# Patient Record
Sex: FEMALE | Race: BLACK OR AFRICAN AMERICAN | NOT HISPANIC OR LATINO | Employment: FULL TIME | URBAN - METROPOLITAN AREA
[De-identification: names, ages, dates, MRNs, and addresses within clinical notes are randomized per-mention and may not be internally consistent; named-entity substitution may affect disease eponyms.]

---

## 2024-01-21 NOTE — PROGRESS NOTES
"Assessment/Plan   {Assess/PlanSmartLinks:82840}    Bekah AMIN Jay, APRN-CNM     José Lopez is a 53 y.o. female who is here for a routine exam. Periods are {gyn period regularity:715}, lasting {numbers; 0-10:80594} days. Dysmenorrhea:{gyn dysmenorrhea:716}. Cyclic symptoms include {sys gyn cyclic sx:34776}. No intermenstrual bleeding, spotting, or discharge.    Current contraception: {contraceptive method:5051}  History of abnormal Pap smear: {yes***/no:16415::\"no\"}  History of LEEP or cryo:  {yes***/no:61295::\"no\"}  Family history of uterine or ovarian cancer: {yes***/no:44627::\"no\"}  Family history of breast cancer: {yes***/no:36036::\"no\"}  Regular self breast exam: {yes/no:63}  History of abnormal mammogram: {yes***/no:71069::\"no\"}    Menstrual History:  OB History    No obstetric history on file.        Menarche age: ***  No LMP recorded.         ROS    Objective   There were no vitals taken for this visit.    General:   Alert and oriented x 3   Heart:  Thyroid: Regular rate, rhythm  Euthyroid, normal shape and size   Lungs:  Breast: Clear to auscultation bilaterally  Symmetrical, no skin changes/nipple discharge, redness, tenderness, no masses palpated bilaterally   Abdomen: Soft, non tender   Vulva: EGBUS normal   Vagina: Pink, normal discharge   Cervix: No CMT   Uterus: Normal shape, size   Adnexa: NT bilaterally      "

## 2024-01-30 ENCOUNTER — APPOINTMENT (OUTPATIENT)
Dept: OBSTETRICS AND GYNECOLOGY | Facility: CLINIC | Age: 54
End: 2024-01-30
Payer: MEDICARE

## 2024-02-02 ENCOUNTER — APPOINTMENT (OUTPATIENT)
Dept: PRIMARY CARE | Facility: CLINIC | Age: 54
End: 2024-02-02
Payer: MEDICARE

## 2024-02-14 ENCOUNTER — APPOINTMENT (OUTPATIENT)
Dept: OBSTETRICS AND GYNECOLOGY | Facility: HOSPITAL | Age: 54
End: 2024-02-14
Payer: MEDICARE

## 2024-02-23 ENCOUNTER — APPOINTMENT (OUTPATIENT)
Dept: ORTHOPEDIC SURGERY | Facility: CLINIC | Age: 54
End: 2024-02-23
Payer: MEDICARE

## 2024-03-04 ENCOUNTER — APPOINTMENT (OUTPATIENT)
Dept: OBSTETRICS AND GYNECOLOGY | Facility: HOSPITAL | Age: 54
End: 2024-03-04
Payer: MEDICARE

## 2024-03-15 ENCOUNTER — APPOINTMENT (OUTPATIENT)
Dept: RADIOLOGY | Facility: CLINIC | Age: 54
End: 2024-03-15
Payer: MEDICARE

## 2024-03-15 ENCOUNTER — APPOINTMENT (OUTPATIENT)
Dept: ORTHOPEDIC SURGERY | Facility: CLINIC | Age: 54
End: 2024-03-15
Payer: MEDICARE

## 2024-03-19 ENCOUNTER — APPOINTMENT (OUTPATIENT)
Dept: OBSTETRICS AND GYNECOLOGY | Facility: HOSPITAL | Age: 54
End: 2024-03-19
Payer: MEDICARE

## 2024-03-26 ENCOUNTER — APPOINTMENT (OUTPATIENT)
Dept: PRIMARY CARE | Facility: CLINIC | Age: 54
End: 2024-03-26
Payer: MEDICARE

## 2024-04-16 ENCOUNTER — APPOINTMENT (OUTPATIENT)
Dept: OBSTETRICS AND GYNECOLOGY | Facility: HOSPITAL | Age: 54
End: 2024-04-16
Payer: MEDICARE

## 2024-04-29 ENCOUNTER — APPOINTMENT (OUTPATIENT)
Dept: OBSTETRICS AND GYNECOLOGY | Facility: HOSPITAL | Age: 54
End: 2024-04-29
Payer: MEDICARE

## 2024-05-22 ENCOUNTER — LAB (OUTPATIENT)
Dept: LAB | Facility: LAB | Age: 54
End: 2024-05-22
Payer: MEDICARE

## 2024-05-22 ENCOUNTER — OFFICE VISIT (OUTPATIENT)
Dept: OBSTETRICS AND GYNECOLOGY | Facility: HOSPITAL | Age: 54
End: 2024-05-22
Payer: MEDICARE

## 2024-05-22 VITALS
WEIGHT: 229 LBS | DIASTOLIC BLOOD PRESSURE: 76 MMHG | BODY MASS INDEX: 42.14 KG/M2 | SYSTOLIC BLOOD PRESSURE: 153 MMHG | HEIGHT: 62 IN | HEART RATE: 79 BPM

## 2024-05-22 DIAGNOSIS — Z12.4 SCREENING FOR CERVICAL CANCER: ICD-10-CM

## 2024-05-22 DIAGNOSIS — Z00.00 HEALTHCARE MAINTENANCE: Primary | ICD-10-CM

## 2024-05-22 DIAGNOSIS — N95.1 PERIMENOPAUSE: ICD-10-CM

## 2024-05-22 DIAGNOSIS — Z11.3 SCREENING FOR STD (SEXUALLY TRANSMITTED DISEASE): ICD-10-CM

## 2024-05-22 DIAGNOSIS — Z12.31 ENCOUNTER FOR SCREENING MAMMOGRAM FOR MALIGNANT NEOPLASM OF BREAST: ICD-10-CM

## 2024-05-22 DIAGNOSIS — Z00.00 HEALTHCARE MAINTENANCE: ICD-10-CM

## 2024-05-22 LAB
EST. AVERAGE GLUCOSE BLD GHB EST-MCNC: 108 MG/DL
FSH SERPL-ACNC: 60.4 IU/L
HBA1C MFR BLD: 5.4 %
HBV SURFACE AG SERPL QL IA: NONREACTIVE
HCV AB SER QL: NONREACTIVE
HDLC SERPL-MCNC: 63.1 MG/DL
HIV 1+2 AB+HIV1 P24 AG SERPL QL IA: NONREACTIVE
LDLC SERPL DIRECT ASSAY-MCNC: 168 MG/DL (ref 0–129)
LH SERPL-ACNC: 48.4 IU/L
PROGEST SERPL-MCNC: <0.3 NG/ML
TREPONEMA PALLIDUM IGG+IGM AB [PRESENCE] IN SERUM OR PLASMA BY IMMUNOASSAY: NONREACTIVE

## 2024-05-22 PROCEDURE — 83002 ASSAY OF GONADOTROPIN (LH): CPT

## 2024-05-22 PROCEDURE — 99386 PREV VISIT NEW AGE 40-64: CPT

## 2024-05-22 PROCEDURE — 86780 TREPONEMA PALLIDUM: CPT

## 2024-05-22 PROCEDURE — 87389 HIV-1 AG W/HIV-1&-2 AB AG IA: CPT

## 2024-05-22 PROCEDURE — 1036F TOBACCO NON-USER: CPT

## 2024-05-22 PROCEDURE — 87491 CHLMYD TRACH DNA AMP PROBE: CPT

## 2024-05-22 PROCEDURE — 83001 ASSAY OF GONADOTROPIN (FSH): CPT

## 2024-05-22 PROCEDURE — 36415 COLL VENOUS BLD VENIPUNCTURE: CPT

## 2024-05-22 PROCEDURE — 83036 HEMOGLOBIN GLYCOSYLATED A1C: CPT

## 2024-05-22 PROCEDURE — 86803 HEPATITIS C AB TEST: CPT

## 2024-05-22 PROCEDURE — 87661 TRICHOMONAS VAGINALIS AMPLIF: CPT

## 2024-05-22 PROCEDURE — 82672 ASSAY OF ESTROGEN: CPT

## 2024-05-22 PROCEDURE — 88142 CYTOPATH C/V THIN LAYER: CPT | Mod: TC

## 2024-05-22 PROCEDURE — 87340 HEPATITIS B SURFACE AG IA: CPT

## 2024-05-22 PROCEDURE — 84144 ASSAY OF PROGESTERONE: CPT

## 2024-05-22 PROCEDURE — 87624 HPV HI-RISK TYP POOLED RSLT: CPT

## 2024-05-22 PROCEDURE — 83718 ASSAY OF LIPOPROTEIN: CPT

## 2024-05-22 PROCEDURE — 83721 ASSAY OF BLOOD LIPOPROTEIN: CPT

## 2024-05-22 RX ORDER — MULTIVIT-MIN/IRON FUM/FOLIC AC 7.5 MG-4
1 TABLET ORAL DAILY
COMMUNITY

## 2024-05-22 RX ORDER — ASPIRIN 81 MG/1
81 TABLET ORAL DAILY
COMMUNITY

## 2024-05-22 SDOH — ECONOMIC STABILITY: FOOD INSECURITY: WITHIN THE PAST 12 MONTHS, THE FOOD YOU BOUGHT JUST DIDN'T LAST AND YOU DIDN'T HAVE MONEY TO GET MORE.: NEVER TRUE

## 2024-05-22 SDOH — ECONOMIC STABILITY: TRANSPORTATION INSECURITY
IN THE PAST 12 MONTHS, HAS LACK OF TRANSPORTATION KEPT YOU FROM MEETINGS, WORK, OR FROM GETTING THINGS NEEDED FOR DAILY LIVING?: NO

## 2024-05-22 SDOH — ECONOMIC STABILITY: FOOD INSECURITY: WITHIN THE PAST 12 MONTHS, YOU WORRIED THAT YOUR FOOD WOULD RUN OUT BEFORE YOU GOT MONEY TO BUY MORE.: NEVER TRUE

## 2024-05-22 SDOH — ECONOMIC STABILITY: TRANSPORTATION INSECURITY
IN THE PAST 12 MONTHS, HAS THE LACK OF TRANSPORTATION KEPT YOU FROM MEDICAL APPOINTMENTS OR FROM GETTING MEDICATIONS?: NO

## 2024-05-22 ASSESSMENT — SOCIAL DETERMINANTS OF HEALTH (SDOH)
WITHIN THE LAST YEAR, HAVE TO BEEN RAPED OR FORCED TO HAVE ANY KIND OF SEXUAL ACTIVITY BY YOUR PARTNER OR EX-PARTNER?: NO
WITHIN THE LAST YEAR, HAVE YOU BEEN KICKED, HIT, SLAPPED, OR OTHERWISE PHYSICALLY HURT BY YOUR PARTNER OR EX-PARTNER?: NO
WITHIN THE LAST YEAR, HAVE YOU BEEN AFRAID OF YOUR PARTNER OR EX-PARTNER?: NO
WITHIN THE LAST YEAR, HAVE YOU BEEN HUMILIATED OR EMOTIONALLY ABUSED IN OTHER WAYS BY YOUR PARTNER OR EX-PARTNER?: NO

## 2024-05-22 ASSESSMENT — PATIENT HEALTH QUESTIONNAIRE - PHQ9
2. FEELING DOWN, DEPRESSED OR HOPELESS: NOT AT ALL
1. LITTLE INTEREST OR PLEASURE IN DOING THINGS: NOT AT ALL
SUM OF ALL RESPONSES TO PHQ9 QUESTIONS 1 & 2: 0

## 2024-05-22 ASSESSMENT — PAIN SCALES - GENERAL: PAINLEVEL: 0-NO PAIN

## 2024-05-22 NOTE — PROGRESS NOTES
"José Lopez is a 54 y.o. female who is here for Annual Exam (patient here for annual /lmp 24/last pap 22/sti testing /chaperone decline).     Concerns today:  No concerns    Periods are irregular, lasting  varying  days; perimenopausal  Dysmenorrhea:none.   Cyclic symptoms include none.      Sexual Activity: sexually active, male partners; Patient reports 1 partners in the last 12 months.  Pain with intercourse? No   Loss of desire? No       History of prior STI: none  Desires STI screening? Yes    Current contraception: none    Last pap: 2022  History of abnormal Pap smear: yes - WNL; HPV+ in   Family history of uterine or ovarian cancer: no    Last mammogram: 2023  History of abnormal mammogram: no  Family history of breast cancer: no  OB History    Para Term  AB Living   4 4 0 0 0 0   SAB IAB Ectopic Multiple Live Births   0 0 0 0 0      Objective   /76 (BP Location: Right arm, Patient Position: Lying, BP Cuff Size: Large adult long)   Pulse 79   Ht 1.562 m (5' 1.5\")   Wt 104 kg (229 lb)   LMP 2024 (Exact Date)    Physical Exam  Constitutional:       Appearance: Normal appearance.   Genitourinary:      Vulva, bladder, rectum and urethral meatus normal.      No vaginal prolapse present.     Mild vaginal atrophy present.     Pelvic exam was performed with patient in the lithotomy position.   Breasts:     Breasts are soft.     Right: Normal.      Left: Normal.   HENT:      Head: Normocephalic and atraumatic.      Mouth/Throat:      Mouth: Mucous membranes are moist.   Cardiovascular:      Rate and Rhythm: Normal rate and regular rhythm.      Heart sounds: Normal heart sounds.   Pulmonary:      Effort: Pulmonary effort is normal.      Breath sounds: Normal breath sounds.   Abdominal:      Palpations: Abdomen is soft.   Musculoskeletal:         General: Normal range of motion.      Cervical back: Normal range of motion and neck supple.   Neurological: "      Mental Status: She is alert and oriented to person, place, and time.   Skin:     General: Skin is warm and dry.   Psychiatric:         Mood and Affect: Mood normal.         Behavior: Behavior normal.         Thought Content: Thought content normal.         Judgment: Judgment normal.          Assessment/Plan   Problem List Items Addressed This Visit    None  Visit Diagnoses         Codes    Healthcare maintenance    -  Primary Z00.00    Relevant Orders    Hemoglobin A1C    Cholesterol, HDL    Cholesterol, LDL Direct    Screening for STD (sexually transmitted disease)     Z11.3    Relevant Orders    Hepatitis B Surface Antigen    Hepatitis C Antibody    HIV 1/2 Antigen/Antibody Screen with Reflex to Confirmation    Syphilis Screen with Reflex    Perimenopause     N95.1    Relevant Orders    FSH & LH    Estrogens, Total    Progesterone    Screening for cervical cancer     Z12.4    Relevant Orders    THINPREP PAP TEST    Encounter for screening mammogram for malignant neoplasm of breast     Z12.31    Relevant Orders    BI mammo bilateral screening tomosynthesis          No follow-ups on file.  Rossana Rene, DANE-MEE

## 2024-05-23 LAB
C TRACH RRNA SPEC QL NAA+PROBE: NEGATIVE
N GONORRHOEA DNA SPEC QL PROBE+SIG AMP: NEGATIVE
T VAGINALIS RRNA SPEC QL NAA+PROBE: NEGATIVE

## 2024-06-01 LAB — ESTROGEN SERPL-MCNC: 93 PG/ML

## 2024-06-05 LAB
CYTOLOGY CMNT CVX/VAG CYTO-IMP: NORMAL
HPV HR 12 DNA GENITAL QL NAA+PROBE: NEGATIVE
HPV HR GENOTYPES PNL CVX NAA+PROBE: NEGATIVE
HPV16 DNA SPEC QL NAA+PROBE: NEGATIVE
HPV18 DNA SPEC QL NAA+PROBE: NEGATIVE
LAB AP HPV GENOTYPE QUESTION: YES
LAB AP HPV HR: NORMAL
LAB AP PAP ADDITIONAL TESTS: NORMAL
LABORATORY COMMENT REPORT: NORMAL
LABORATORY COMMENT REPORT: NORMAL
LMP START DATE: NORMAL
PATH REPORT.TOTAL CANCER: NORMAL

## 2024-06-28 ENCOUNTER — APPOINTMENT (OUTPATIENT)
Dept: CARDIOLOGY | Facility: HOSPITAL | Age: 54
End: 2024-06-28

## 2024-06-28 DIAGNOSIS — I10 HYPERTENSION, UNSPECIFIED TYPE: Primary | ICD-10-CM

## 2024-07-31 ENCOUNTER — APPOINTMENT (OUTPATIENT)
Dept: CARDIOLOGY | Facility: HOSPITAL | Age: 54
End: 2024-07-31

## 2024-07-31 DIAGNOSIS — R07.9 CHEST PAIN, UNSPECIFIED TYPE: ICD-10-CM

## 2024-10-09 ENCOUNTER — APPOINTMENT (OUTPATIENT)
Dept: ORTHOPEDIC SURGERY | Facility: CLINIC | Age: 54
End: 2024-10-09
Payer: MEDICARE

## 2024-10-11 ENCOUNTER — APPOINTMENT (OUTPATIENT)
Dept: ORTHOPEDIC SURGERY | Facility: CLINIC | Age: 54
End: 2024-10-11
Payer: MEDICARE

## 2024-10-18 ENCOUNTER — OFFICE VISIT (OUTPATIENT)
Dept: ORTHOPEDIC SURGERY | Facility: CLINIC | Age: 54
End: 2024-10-18
Payer: MEDICARE

## 2024-10-18 ENCOUNTER — HOSPITAL ENCOUNTER (OUTPATIENT)
Dept: RADIOLOGY | Facility: CLINIC | Age: 54
Discharge: HOME | End: 2024-10-18
Payer: MEDICARE

## 2024-10-18 DIAGNOSIS — M79.641 BILATERAL HAND PAIN: ICD-10-CM

## 2024-10-18 DIAGNOSIS — M79.642 BILATERAL HAND PAIN: ICD-10-CM

## 2024-10-18 DIAGNOSIS — M79.642 BILATERAL HAND PAIN: Primary | ICD-10-CM

## 2024-10-18 DIAGNOSIS — M79.641 BILATERAL HAND PAIN: Primary | ICD-10-CM

## 2024-10-18 PROCEDURE — 73130 X-RAY EXAM OF HAND: CPT | Mod: 50

## 2024-10-18 PROCEDURE — 99214 OFFICE O/P EST MOD 30 MIN: CPT | Performed by: STUDENT IN AN ORGANIZED HEALTH CARE EDUCATION/TRAINING PROGRAM

## 2024-10-18 ASSESSMENT — PAIN - FUNCTIONAL ASSESSMENT: PAIN_FUNCTIONAL_ASSESSMENT: NO/DENIES PAIN

## 2024-10-20 NOTE — PROGRESS NOTES
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    Symptoms new patient evaluation of bilateral hand paresthesias.  She explains approximately 1 year of worsening radial sided palmar paresthesias, worse at night causing her to shake her hands out, worse with prolonged positions such as holding the steering wheel.  She denies any weakness or paresthesias at rest.  She has not tried any treatments of any kind.  Denies any history of cervical spine issues    PHYSICAL EXAM    Extremities / Musculoskeletal:                  Bilateral hands:  Approximately 1.5 x 2 cm simple soft tissue masses about the volar wrist.  No overlying skin changes.  Soft and compressible.  Nonpulsatile.  Negative Tinel's.  Positive transillumination, otherwise no gross deformity no active skin lesions no open wounds no erythema edema or ecchymoses.  Full composite digital flexion full resisted extension at the MCPs and IP's.  Positive modified Durkan's, positive Tinel at the carpal tunnel, negative Phalen's.  Negative Tinel at the cubital tunnel negative deep elbow flexion test, no signs of ulnar nerve instability at the cubital tunnel.  Motor intact radial ulnar median AIN PIN.  Sensation tact light touch radial ulnar median.  2+ radial warm well-perfused      IMAGING / LABS / EMGs:    No imaging obtained for today's visit    Past Medical History:   Diagnosis Date    Abnormal Pap smear of cervix     Anemia     CTS (carpal tunnel syndrome)     Encounter for gynecological examination (general) (routine) without abnormal findings 06/27/2022    Well woman exam with routine gynecological exam    Encounter for other screening for malignant neoplasm of breast     Breast cancer screening    Encounter for  pregnancy test, result negative 2020    Urine pregnancy test negative    Headache, unspecified     Frequent headaches    Hepatitis B     Immunization not carried out because of patient refusal 10/27/2016    Influenza vaccination declined    Migraine     Pain in left foot 10/19/2016    Left foot pain    Pain in unspecified elbow 2014    Elbow pain    Personal history of other diseases of the musculoskeletal system and connective tissue 2013    History of lateral epicondylitis of left elbow    Unspecified injury of unspecified wrist, hand and finger(s), initial encounter 2015    Wrist injury    Vitamin D deficiency, unspecified 2020    Vitamin D deficiency       Medication Documentation Review Audit       Reviewed by JALEN Curyr on 10/18/24 at 1344      Medication Order Taking? Sig Documenting Provider Last Dose Status   aspirin 81 mg EC tablet [id number]  Take 1 tablet (81 mg) by mouth once daily. Historical Provider, MD  Active   B complex-vitamin C-folic acid (Nephro-Candelaria Rx) 1- mg-mg-mcg tablet [id number]  Take 1 tablet by mouth once daily with breakfast. Historical Provider, MD  Active   multivitamin with minerals tablet [id number]  Take 1 tablet by mouth once daily. Historical Provider, MD  Active                    Allergies   Allergen Reactions    Penicillins Rash and Unknown       Past Surgical History:   Procedure Laterality Date    CERVICAL BIOPSY  W/ LOOP ELECTRODE EXCISION       SECTION, LOW TRANSVERSE      COLPOSCOPY      OTHER SURGICAL HISTORY  10/05/2021    Hernia repair    OTHER SURGICAL HISTORY  2021     section         ASSESSMENT:   Bilateral carpal tunnel syndrome  Volar wrist ganglion cyst    We discussed my assessment as well as the available treatment options with their associated risks and benefits.  At this point she is treatment naïve, but I would recommend nocturnal extension bracing as well as obtaining an EMG and nerve conduction  study of the bilateral upper extremities to help confirm the diagnosis.  We discussed that further treatment options include corticosteroid injections the carpal tunnel as well as surgical release of the transverse carpal ligament.    We discussed that if we do perform an open carpal tunnel release then at the same time given the proximity of surgical site we would likely perform excisional biopsy with the soft tissue masses which are consistent with ganglion cyst.    PLAN:   Cast techs to fit for bilateral removable cock-up wrist braces to be worn at night  Bilateral upper extremity EMG ordered    Follow-up in: 1 month, or after completion of the electrodiagnostic studies  XR at next visit: Yes bilateral wrist x-ray series      Kermit Regan M.D.    Department of Orthopaedics  Hand/Upper Extremity  Phone: [phone]  Appt. Phone: [phone]

## 2024-11-13 ENCOUNTER — APPOINTMENT (OUTPATIENT)
Dept: RADIOLOGY | Facility: HOSPITAL | Age: 54
End: 2024-11-13
Payer: MEDICARE

## 2024-11-15 ENCOUNTER — APPOINTMENT (OUTPATIENT)
Dept: ORTHOPEDIC SURGERY | Facility: CLINIC | Age: 54
End: 2024-11-15
Payer: MEDICARE

## 2025-02-05 ENCOUNTER — HOSPITAL ENCOUNTER (OUTPATIENT)
Dept: RADIOLOGY | Facility: HOSPITAL | Age: 55
End: 2025-02-05
Payer: COMMERCIAL

## 2025-02-06 ENCOUNTER — OFFICE VISIT (OUTPATIENT)
Dept: ORTHOPEDIC SURGERY | Facility: HOSPITAL | Age: 55
End: 2025-02-06
Payer: COMMERCIAL

## 2025-02-06 ENCOUNTER — HOSPITAL ENCOUNTER (OUTPATIENT)
Dept: RADIOLOGY | Facility: HOSPITAL | Age: 55
Discharge: HOME | End: 2025-02-06
Payer: COMMERCIAL

## 2025-02-06 DIAGNOSIS — S76.311A HAMSTRING STRAIN, RIGHT, INITIAL ENCOUNTER: Primary | ICD-10-CM

## 2025-02-06 DIAGNOSIS — M79.651 ACUTE THIGH PAIN, RIGHT: ICD-10-CM

## 2025-02-06 PROBLEM — M77.12 LATERAL EPICONDYLITIS OF LEFT ELBOW: Status: ACTIVE | Noted: 2025-02-06

## 2025-02-06 PROBLEM — T07.XXXA MULTIPLE BRUISES: Status: ACTIVE | Noted: 2025-02-06

## 2025-02-06 PROBLEM — E55.9 VITAMIN D DEFICIENCY: Status: ACTIVE | Noted: 2025-02-06

## 2025-02-06 PROBLEM — S79.919A HIP INJURY: Status: ACTIVE | Noted: 2025-02-06

## 2025-02-06 PROBLEM — R03.0 ELEVATED BLOOD PRESSURE READING WITHOUT DIAGNOSIS OF HYPERTENSION: Status: ACTIVE | Noted: 2023-01-11

## 2025-02-06 PROBLEM — R51.9 FREQUENT HEADACHES: Status: ACTIVE | Noted: 2025-02-06

## 2025-02-06 PROBLEM — K52.9 ILEITIS: Status: ACTIVE | Noted: 2025-02-06

## 2025-02-06 PROBLEM — Z11.52 ENCOUNTER FOR SCREENING LABORATORY TESTING FOR COVID-19 VIRUS: Status: ACTIVE | Noted: 2025-02-06

## 2025-02-06 PROBLEM — S70.11XA: Status: ACTIVE | Noted: 2021-03-16

## 2025-02-06 PROBLEM — M75.82 TENDINITIS OF LEFT ROTATOR CUFF: Status: ACTIVE | Noted: 2025-02-06

## 2025-02-06 PROBLEM — I15.9 SECONDARY HYPERTENSION: Status: ACTIVE | Noted: 2023-10-19

## 2025-02-06 PROBLEM — E78.2 MIXED HYPERLIPIDEMIA: Status: ACTIVE | Noted: 2025-02-06

## 2025-02-06 PROBLEM — M65.4 RADIAL STYLOID TENOSYNOVITIS OF RIGHT HAND: Status: ACTIVE | Noted: 2025-02-06

## 2025-02-06 PROBLEM — R73.09 ABNORMAL GLUCOSE: Status: ACTIVE | Noted: 2025-02-06

## 2025-02-06 PROBLEM — S40.021A ARM BRUISE, RIGHT, INITIAL ENCOUNTER: Status: ACTIVE | Noted: 2021-03-16

## 2025-02-06 PROBLEM — M25.512 LEFT SHOULDER PAIN: Status: ACTIVE | Noted: 2025-02-06

## 2025-02-06 PROBLEM — M76.52 PATELLAR TENDINITIS, LEFT KNEE: Status: ACTIVE | Noted: 2025-02-06

## 2025-02-06 PROBLEM — M79.671 RIGHT FOOT PAIN: Status: ACTIVE | Noted: 2025-02-06

## 2025-02-06 PROBLEM — N92.6 ABNORMAL MENSTRUATION: Status: ACTIVE | Noted: 2025-02-06

## 2025-02-06 PROBLEM — E66.01 MORBID OBESITY (MULTI): Status: ACTIVE | Noted: 2025-02-06

## 2025-02-06 PROBLEM — J04.0 LARYNGITIS: Status: ACTIVE | Noted: 2025-02-06

## 2025-02-06 PROBLEM — R39.9 UTI SYMPTOMS: Status: ACTIVE | Noted: 2025-02-06
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
## 2025-02-06 PROBLEM — M54.50 LUMBAR PAIN: Status: ACTIVE | Noted: 2025-02-06

## 2025-02-06 PROBLEM — I10 REACTIVE HYPERTENSION: Status: ACTIVE | Noted: 2025-02-06

## 2025-02-06 PROBLEM — I87.309 CHRONIC PERIPHERAL VENOUS HYPERTENSION: Status: ACTIVE | Noted: 2025-02-06

## 2025-02-06 PROBLEM — M16.12 OSTEOARTHRITIS OF LEFT HIP: Status: ACTIVE | Noted: 2025-02-06

## 2025-02-06 PROBLEM — M43.10 ACQUIRED SPONDYLOLISTHESIS: Status: ACTIVE | Noted: 2025-02-06

## 2025-02-06 PROBLEM — I10 ELEVATED BLOOD PRESSURE READING WITH DIAGNOSIS OF HYPERTENSION: Status: ACTIVE | Noted: 2025-02-06

## 2025-02-06 PROBLEM — S91.332A PUNCTURE WOUND OF FOOT, LEFT: Status: ACTIVE | Noted: 2025-02-06

## 2025-02-06 PROBLEM — R30.0 DYSURIA: Status: ACTIVE | Noted: 2025-02-06

## 2025-02-06 PROBLEM — G43.009 MIGRAINE WITHOUT AURA AND WITHOUT STATUS MIGRAINOSUS, NOT INTRACTABLE: Status: ACTIVE | Noted: 2025-02-06

## 2025-02-06 PROBLEM — M25.562 LEFT KNEE PAIN: Status: ACTIVE | Noted: 2025-02-06

## 2025-02-06 PROBLEM — M25.552 HIP PAIN, LEFT: Status: ACTIVE | Noted: 2025-02-06

## 2025-02-06 PROBLEM — R53.82 CHRONIC FATIGUE: Status: ACTIVE | Noted: 2025-02-06

## 2025-02-06 PROBLEM — M65.4 DE QUERVAIN'S TENOSYNOVITIS, RIGHT: Status: ACTIVE | Noted: 2025-02-06

## 2025-02-06 PROCEDURE — 1036F TOBACCO NON-USER: CPT | Performed by: FAMILY MEDICINE

## 2025-02-06 PROCEDURE — 99213 OFFICE O/P EST LOW 20 MIN: CPT | Performed by: FAMILY MEDICINE

## 2025-02-06 PROCEDURE — 73552 X-RAY EXAM OF FEMUR 2/>: CPT | Mod: RT
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
## 2025-02-07 ENCOUNTER — APPOINTMENT (OUTPATIENT)
Dept: RADIOLOGY | Facility: HOSPITAL | Age: 55
End: 2025-02-07
Payer: COMMERCIAL

## 2025-02-10 ENCOUNTER — APPOINTMENT (OUTPATIENT)
Dept: VASCULAR SURGERY | Facility: HOSPITAL | Age: 55
End: 2025-02-10
Payer: COMMERCIAL

## 2025-02-13 ENCOUNTER — HOSPITAL ENCOUNTER (OUTPATIENT)
Facility: CLINIC | Age: 55
Discharge: HOME | End: 2025-02-13
Payer: COMMERCIAL

## 2025-02-13 DIAGNOSIS — M79.642 BILATERAL HAND PAIN: ICD-10-CM

## 2025-02-13 DIAGNOSIS — M79.641 BILATERAL HAND PAIN: ICD-10-CM

## 2025-02-13 PROCEDURE — 95886 MUSC TEST DONE W/N TEST COMP: CPT | Performed by: STUDENT IN AN ORGANIZED HEALTH CARE EDUCATION/TRAINING PROGRAM

## 2025-02-13 PROCEDURE — 95912 NRV CNDJ TEST 11-12 STUDIES: CPT | Performed by: STUDENT IN AN ORGANIZED HEALTH CARE EDUCATION/TRAINING PROGRAM

## 2025-02-13 PROCEDURE — 95885 MUSC TST DONE W/NERV TST LIM: CPT | Performed by: STUDENT IN AN ORGANIZED HEALTH CARE EDUCATION/TRAINING PROGRAM

## 2025-02-18 ENCOUNTER — HOSPITAL ENCOUNTER (OUTPATIENT)
Dept: RADIOLOGY | Facility: HOSPITAL | Age: 55
Discharge: HOME | End: 2025-02-18
Payer: COMMERCIAL

## 2025-02-18 ENCOUNTER — OFFICE VISIT (OUTPATIENT)
Dept: ORTHOPEDIC SURGERY | Facility: HOSPITAL | Age: 55
End: 2025-02-18
Payer: COMMERCIAL

## 2025-02-18 DIAGNOSIS — M79.641 BILATERAL HAND PAIN: ICD-10-CM

## 2025-02-18 DIAGNOSIS — M79.642 BILATERAL HAND PAIN: ICD-10-CM

## 2025-02-18 DIAGNOSIS — M79.89 SOFT TISSUE MASS: Primary | ICD-10-CM

## 2025-02-18 PROCEDURE — 99214 OFFICE O/P EST MOD 30 MIN: CPT | Performed by: STUDENT IN AN ORGANIZED HEALTH CARE EDUCATION/TRAINING PROGRAM

## 2025-02-18 PROCEDURE — 73110 X-RAY EXAM OF WRIST: CPT | Mod: BILATERAL PROCEDURE | Performed by: STUDENT IN AN ORGANIZED HEALTH CARE EDUCATION/TRAINING PROGRAM

## 2025-02-18 PROCEDURE — 73110 X-RAY EXAM OF WRIST: CPT | Mod: 50

## 2025-03-02 NOTE — PROGRESS NOTES
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  Denies a history of rheumatoid arthritis    Symptoms new patient evaluation of bilateral hand paresthesias.  She explains approximately 1 year of worsening radial sided palmar paresthesias, worse at night causing her to shake her hands out, worse with prolonged positions such as holding the steering wheel.  She denies any weakness or paresthesias at rest.  She has not tried any treatments of any kind.  Denies any history of cervical spine issues    Interval update 2/18/2025:  Patient return for scheduled follow-up visit.  She has since completed her electrodiagnostic studies.  Unfortunately her symptoms are severe and persistent especially nocturnal symptoms.  Nocturnal extension bracing has not helped this is irritating soft tissue fullness of the wrist.  She would like to move forward with treatment options.    PHYSICAL EXAM    Extremities / Musculoskeletal:                  Bilateral hands:  Approximately 1.5 x 2 cm simple soft tissue masses about the volar wrist.  No overlying skin changes.  Soft and compressible.  Nonpulsatile.  Negative Tinel's.  Positive transillumination, otherwise no gross deformity no active skin lesions no open wounds no erythema edema or ecchymoses.  Full composite digital flexion full resisted extension at the MCPs and IP's.  Positive modified Durkan's, positive Tinel at the carpal tunnel, negative Phalen's.  Negative Tinel at the cubital tunnel negative deep elbow flexion test, no signs of ulnar nerve instability at the cubital tunnel.  Motor intact radial ulnar median AIN PIN.  Sensation tact light touch radial ulnar median.  2+ radial warm well-perfused      IMAGING / LABS / EMGs:    No  imaging obtained for today's visit    Electrodiagnostic studies performed on 2/13/2025 independently reviewed demonstrating findings of severe bilateral median nerve compression at the level of the wrist.  There is signs of axonal loss and fibrillations the bilateral abductor pollicis brevis muscles on EMG    Past Medical History:   Diagnosis Date    Abnormal Pap smear of cervix     Anemia     CTS (carpal tunnel syndrome)     Encounter for gynecological examination (general) (routine) without abnormal findings 06/27/2022    Well woman exam with routine gynecological exam    Encounter for other screening for malignant neoplasm of breast     Breast cancer screening    Encounter for pregnancy test, result negative 12/21/2020    Urine pregnancy test negative    Headache, unspecified     Frequent headaches    Hepatitis B     Immunization not carried out because of patient refusal 10/27/2016    Influenza vaccination declined    Migraine     Pain in left foot 10/19/2016    Left foot pain    Pain in unspecified elbow 08/07/2014    Elbow pain    Personal history of other diseases of the musculoskeletal system and connective tissue 12/09/2013    History of lateral epicondylitis of left elbow    Unspecified injury of unspecified wrist, hand and finger(s), initial encounter 03/31/2015    Wrist injury    Vitamin D deficiency, unspecified 12/22/2020    Vitamin D deficiency       Medication Documentation Review Audit       Reviewed by Mervat Yoon MA (Medical Assistant) on 02/18/25 at 1113      Medication Order Taking? Sig Documenting Provider Last Dose Status   APPLE CIDER VINEGAR ORAL [id number] Yes Take 1 tablet by mouth once daily. Historical Provider, MD  Active   aspirin 81 mg EC tablet [id number] Yes Take 1 tablet (81 mg) by mouth once daily. Historical Provider, MD  Active   B complex-vitamin C-folic acid (Nephro-Candelaria Rx) 1- mg-mg-mcg tablet [id number] Yes Take 1 tablet by mouth once daily with breakfast.  Historical Provider, MD  Active   multivitamin with minerals tablet [id number] Yes Take 1 tablet by mouth once daily. Historical Provider, MD  Active                    Allergies   Allergen Reactions    Latex Rash    Penicillins Rash and Unknown       Past Surgical History:   Procedure Laterality Date    CERVICAL BIOPSY  W/ LOOP ELECTRODE EXCISION       SECTION, LOW TRANSVERSE      COLPOSCOPY      OTHER SURGICAL HISTORY  10/05/2021    Hernia repair    OTHER SURGICAL HISTORY  2021     section         ASSESSMENT:   Bilateral carpal tunnel syndrome  Volar wrist ganglion cyst    We discussed my assessment as well as the available treatment options with their associated risks and benefits.  At this point she is treatment naïve, but I would recommend nocturnal extension bracing as well as obtaining an EMG and nerve conduction study of the bilateral upper extremities to help confirm the diagnosis.  We discussed that further treatment options include corticosteroid injections the carpal tunnel as well as surgical release of the transverse carpal ligament.    We discussed that if we do perform an open carpal tunnel release then at the same time given the proximity of surgical site we would likely perform excisional biopsy with the soft tissue masses which are consistent with ganglion cyst.    Interval update 2024:  This patient has electrodiagnostic confirmation of severe bilateral carpal tunnel syndrome which is clinically significantly bothering her.  I think would be reasonable proceed with an open carpal tunnel release.  Patient would like to prioritize the left side.  Given the soft tissue fullness and apparent ganglion cyst in the volar wrist I would like to obtain an MRI study of the left wrist prior to surgery.  We discussed the risks of surgery which includes, but is not limited to infection, bleeding, damage surrounding structures, chronic pain, chronic stiffness, incomplete resolution of  her symptoms, wound healing complications, need for subsequent surgery.    PLAN:   Stat left wrist MRI without contrast ordered  Continue nocturnal bracing and possible    Follow-up in: After completion of the MRI study  XR at next visit: none      Kermit Regan M.D.    Department of Orthopaedics  Hand/Upper Extremity  Phone: [phone]  Appt. Phone: [phone]

## 2025-03-05 ENCOUNTER — HOSPITAL ENCOUNTER (OUTPATIENT)
Dept: RADIOLOGY | Facility: HOSPITAL | Age: 55
Discharge: HOME | End: 2025-03-05
Payer: COMMERCIAL

## 2025-03-05 DIAGNOSIS — M79.89 SOFT TISSUE MASS: ICD-10-CM

## 2025-03-05 PROCEDURE — 73221 MRI JOINT UPR EXTREM W/O DYE: CPT | Mod: LT

## 2025-03-12 ENCOUNTER — APPOINTMENT (OUTPATIENT)
Dept: CARDIOLOGY | Facility: HOSPITAL | Age: 55
End: 2025-03-12
Payer: COMMERCIAL

## 2025-04-15 ENCOUNTER — APPOINTMENT (OUTPATIENT)
Dept: ORTHOPEDIC SURGERY | Facility: HOSPITAL | Age: 55
End: 2025-04-15
Payer: COMMERCIAL

## 2025-04-15 ENCOUNTER — OFFICE VISIT (OUTPATIENT)
Dept: ORTHOPEDIC SURGERY | Facility: HOSPITAL | Age: 55
End: 2025-04-15
Payer: COMMERCIAL

## 2025-04-15 DIAGNOSIS — M79.641 BILATERAL HAND PAIN: Primary | ICD-10-CM

## 2025-04-15 DIAGNOSIS — M79.642 BILATERAL HAND PAIN: Primary | ICD-10-CM

## 2025-04-15 PROCEDURE — 99214 OFFICE O/P EST MOD 30 MIN: CPT | Performed by: STUDENT IN AN ORGANIZED HEALTH CARE EDUCATION/TRAINING PROGRAM

## 2025-04-15 NOTE — PROGRESS NOTES
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  Denies a history of rheumatoid arthritis    Symptoms new patient evaluation of bilateral hand paresthesias.  She explains approximately 1 year of worsening radial sided palmar paresthesias, worse at night causing her to shake her hands out, worse with prolonged positions such as holding the steering wheel.  She denies any weakness or paresthesias at rest.  She has not tried any treatments of any kind.  Denies any history of cervical spine issues    Interval update 2/18/2025:  Patient return for scheduled follow-up visit.  She has since completed her electrodiagnostic studies.  Unfortunately her symptoms are severe and persistent especially nocturnal symptoms.  Nocturnal extension bracing has not helped this is irritating soft tissue fullness of the wrist.  She would like to move forward with treatment options.    Interval update 4/15/2025:  Patient returns to discuss results of her MRI.  Unfortunately she is having persistent daily left hand paresthesias in the median nerve distribution.  These are intermittent worse with wrist flexion.  She is unable to wear the extension brace due to discomfort.    PHYSICAL EXAM    Extremities / Musculoskeletal:                  Bilateral hands:  Approximately 1.5 x 2 cm simple soft tissue masses about the volar wrist.  No overlying skin changes.  Soft and compressible.  Nonpulsatile.  Negative Tinel's.  otherwise no gross deformity no active skin lesions no open wounds no erythema edema or ecchymoses.  Full composite digital flexion full resisted extension at the MCPs and IP's.  Positive modified Durkan's, positive Tinel at the carpal tunnel, negative Phalen's.  Negative Tinel at the  cubital tunnel negative deep elbow flexion test, no signs of ulnar nerve instability at the cubital tunnel.  Motor intact radial ulnar median AIN PIN.  Sensation tact light touch radial ulnar median.  2+ radial warm well-perfused      IMAGING / LABS / EMGs:    No imaging obtained for today's visit    Electrodiagnostic studies performed on 2/13/2025 independently reviewed demonstrating findings of severe bilateral median nerve compression at the level of the wrist.  There is signs of axonal loss and fibrillations the bilateral abductor pollicis brevis muscles on EMG    MRI left wrist without contrast performed on 3/5/2025:  IMPRESSION:  1. Marked enlargement of the median nerve proximal to the carpal  tunnel in addition to mild flexor pollicis longus tenosynovitis,  which can be seen with carpal tunnel syndrome..      ASSESSMENT:   Bilateral carpal tunnel syndrome  Volar wrist ganglion cyst    We discussed my assessment as well as the available treatment options with their associated risks and benefits.  At this point she is treatment naïve, but I would recommend nocturnal extension bracing as well as obtaining an EMG and nerve conduction study of the bilateral upper extremities to help confirm the diagnosis.  We discussed that further treatment options include corticosteroid injections the carpal tunnel as well as surgical release of the transverse carpal ligament.    We discussed that if we do perform an open carpal tunnel release then at the same time given the proximity of surgical site we would likely perform excisional biopsy with the soft tissue masses which are consistent with ganglion cyst.    Interval update 2/18/2024:  This patient has electrodiagnostic confirmation of severe bilateral carpal tunnel syndrome which is clinically significantly bothering her.  I think would be reasonable proceed with an open carpal tunnel release.  Patient would like to prioritize the left side.  Given the soft tissue  fullness and apparent ganglion cyst in the volar wrist I would like to obtain an MRI study of the left wrist prior to surgery.  We discussed the risks of surgery which includes, but is not limited to infection, bleeding, damage surrounding structures, chronic pain, chronic stiffness, incomplete resolution of her symptoms, wound healing complications, need for subsequent surgery.    Interval update 4/15/2025:  Patient with persistent carpal tunnel symptoms which has been confirmed by electrodiagnostic studies.  MRI was obtained to evaluate for soft tissue fullness in the distal aspect of the volar forearm.  This appears to be just a markedly enlarged median nerve.  There are no obvious signs of associated mass lesions.  We discussed that it would be appropriate to pursue an open carpal tunnel release however radiating her median nerve paresthesias in her hand.  We discussed that postural symptoms are usually the first thing to improve followed by hopeful improvement in diminished sensation at baseline.  We discussed that weakness really improves after surgery.  We discussed that given the enlargement of the median nerve would recommend an extended carpal tunnel release in order to properly visualize any potential sites of compression and also minimize risk of injury to the specimen recent surgery include but are not limited to infection, bleeding, damage surrounding structures, pain, chronic stiffness, persistent symptoms, recurrent carpal tunnel syndrome, wound healing complications, need for subsequent surgeries.  The patient understands abelacimab.  All questions were answered.  Postop protocol was discussed in detail.  Nocturnal bracing to be discontinued due to discomfort along the nerve    PLAN:   OR planning for left wrist     SURGICAL INDICATION    I reviewed the options for further management of this condition and the likely success rates of each.  The patient feels that they have maximized the benefits of  conservative care, and they do want to go on to surgery.    I reviewed the major risks of surgery including infection; scarring; damage to nerves, tendons, or vessels; stiffness; chronic pain, persistent symptoms, recurrent carpal tunnel syndrome, wound healing complications, need for subsequent surgeries.  And wound healing problems, as well as anesthesia risks.  I answered their questions to their satisfaction.  They were given my contact information and will contact the office when they are ready to schedule an exact surgical date.  Surgery will be posted as follows :    Dx :          Left carpal tunnel syndrome  ICD-10 :        G56.02   Procedure :     Left open carpal tunnel release  CPT :        93149   Anesth :    local + MAC  Location :   [address] 5/19/25  Duration :    45min  Specials :    Small Kameron Chung scissors   PAT :       No  Post-Op Visit :    10-15 days      Follow-up in: After completion of the MRI study  XR at next visit: none      Kermit Regan M.D.    Department of Orthopaedics  Hand/Upper Extremity  Phone: [phone]  Appt. Phone: [phone]

## 2025-04-23 ENCOUNTER — APPOINTMENT (OUTPATIENT)
Dept: PODIATRY | Facility: CLINIC | Age: 55
End: 2025-04-23
Payer: COMMERCIAL

## 2025-04-25 ENCOUNTER — HOSPITAL ENCOUNTER (OUTPATIENT)
Dept: RADIOLOGY | Facility: CLINIC | Age: 55
Discharge: HOME | End: 2025-04-25
Payer: COMMERCIAL

## 2025-04-25 ENCOUNTER — OFFICE VISIT (OUTPATIENT)
Dept: ORTHOPEDIC SURGERY | Facility: CLINIC | Age: 55
End: 2025-04-25
Payer: COMMERCIAL

## 2025-04-25 DIAGNOSIS — M67.873 ACHILLES TENDINOSIS OF RIGHT ANKLE: ICD-10-CM

## 2025-04-25 DIAGNOSIS — M76.61 ACHILLES TENDINITIS, RIGHT LEG: Primary | ICD-10-CM

## 2025-04-25 DIAGNOSIS — M25.571 RIGHT ANKLE PAIN, UNSPECIFIED CHRONICITY: ICD-10-CM

## 2025-04-25 PROCEDURE — 99214 OFFICE O/P EST MOD 30 MIN: CPT | Performed by: ORTHOPAEDIC SURGERY

## 2025-04-25 PROCEDURE — 73610 X-RAY EXAM OF ANKLE: CPT | Mod: RT

## 2025-04-25 NOTE — PROGRESS NOTES
HISTORY OF PRESENT ILLNESS  This is a pleasant 55 y.o. year old female  who presents on 04/25/2025 for evaluation of right ankle pain that has been present over/since sunday.    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    PHYSICAL EXAMINATION  Constitutional Exam: patient's height and weight reviewed, well-kempt  Psychiatric Exam: alert and oriented x 3, appropriate mood and behavior  Eye Exam: DONNY, EOMI  Pulmonary Exam: breathing non-labored, no apparent distress  Lymphatic exam: no appreciable lymphadenopathy in the lower extremities  Cardiovascular exam: DP pulses 2+ bilaterally, PT 2+ bilaterally, toes are pink with good capillary refill, no pitting edema  Skin exam: no open lesions, rashes, abrasions or ulcerations  Neurological exam: sensation to light touch intact in both lower extremities in peripheral and dermatomal distributions (except for any abnormalities noted in musculoskeletal exam)    Musculoskeletal exam: right foot and ankle: pes planus, tight achilles, sore over proximal achilles on palpation but no nodule formation, no pain over distal achilles on palpation, negative calcaneal squeeze test, no pain over PTT, stable ligamentous exam, negative deng test    DATA/RESULTS REVIEW: I personally reviewed the patient's x-ray images and reports of the right ankle showing small posterior exostosis of calcaneus related to chronic insertional achilles  tendinopathy.     ASSESSMENT: proximal achilles tendinitis, asymptomatic insertional achilles tendinopathy chronic  PLAN: I discussed with the patient the differential diagnosis, complex overuse, inflammatory and degenerative related nature of the condition(s) and available treatment option(s).  Fortunately she does have Achilles tendinitis with no significant tearing and so she can be treated with nonoperative management.  Her pain overall has gotten better and so we discussed option for tall cam walker boot but she and I wanted to hold off at this point.  We will have her do a plantar fascial and Achilles stretching protocol and gave her instructional sheet and showed her how to do the stretches appropriately.  She is to do the stretching protocol 3 times a day for 6 weeks and then daily afterwards.  We also would recommend use of a plantar fascia night splint which will help maintain calf flexibility overnight while sleeping which also helps with the Achilles tendinitis, use for 4 to 6 weeks.  Discussed with her importance of wearing good supportive shoes with arch support and avoiding use of flip-flops.  She has some excellent shoes at home we discussed different brands available also.  She can use Voltaren gel as needed for discomfort and rub the area of discomfort.  Physical therapy prescription along with instructions for the therapist were written out today and given to the patient.  Follow-up in 4 to 6 months or as needed.  The patient's questions were answered in detail.      Note dictated with Ykone software, completed without full type editing to avoid delay.      Dear Referring Physician,  It was my pleasure to see your patient, in the office today.  Please refer to the detailed notes above for my findings and recommendations.  Thank you once again for your consultation request.  If you have any further questions or concerns, please feel free to contact me via email or at the  "phone number and address below.  Sincerely,    Beronica Peterson MD   of Orthopedic Surgery, [facility] [employer]  Dual Fellowship-trained in Orthopedic Sports Medicine (Knee and Shoulder), and Foot and Ankle Surgery  [facility] [place]   ABOS Subspecialty Certificate in Orthopedic Sports Medicine  Head Team Physician Case \"[place]\" and [facility] \"[place]\"  Team [facility] [id number] Par[employer]ympic Games  Team [place] Figure Skating Medical Practitioner, including International Event Coverage  Emeritus Director, Foot and Ankle Division, Department of Orthopedics    [address], [place] [address]  [email or web address]  Office [phone]    "

## 2025-04-30 ENCOUNTER — APPOINTMENT (OUTPATIENT)
Dept: CARDIOLOGY | Facility: HOSPITAL | Age: 55
End: 2025-04-30
Payer: COMMERCIAL

## 2025-05-01 DIAGNOSIS — G56.02 LEFT CARPAL TUNNEL SYNDROME: ICD-10-CM

## 2025-05-15 NOTE — DISCHARGE INSTRUCTIONS
Post-Operative Instructions  Dr. Kermit Regan [phone]   [phone]    Dressing:  You have a bandage or splint covering your operative site.    Do not remove the dressing until your next scheduled appointment with your surgeon or therapist. Keep your dressing clean and dry. The dressing will be removed at that appointment.     Post Anesthesia Instructions:  If you received general anesthesia or IV sedation for your procedure for the next 24 hours: No driving, no drinking alcohol, no sleeping aids, no important decision making, and have an adult with you for 24 hours.    Showering:  You may shower following surgery but please adhere to above instructions regarding the dressing. If showering with bandage/splint in place please ensure that it is kept dry and covered while bathing. There are commercially available cast bags that can be used to protect your dressing while showering. If using garbage bags please make sure that there are no holes in the bag and that the bag has been sealed above the dressing. If the bandage gets wet you must contact your surgeon's office to make arrangements to be seen to have the bandage changed.     Ice/Elevation:  The application of ice to your surgical site after surgery will help with pain control and swelling. This can be very effective for 48-72 hours after surgery. Please be careful to avoid getting bandage wet from a leaky ice bag. Please be advised that the ice may need to be applied for longer periods of time for the cooling effect to penetrate the post-operative dressing.     Elevation of the operative site above the level of the heart as much as possible for the first 48-72 hours after surgery. Use your sling if you have been provided one while standing or walking. If your fingers are not included in the dressing you are encouraged to attempt finger range of motion as this will help with your hand swelling and ultimate recovery.    Pain  Medication:  If you received a regional anesthetic on the day of your surgery your arm and hand may be numb for up to 24 hours after surgery. It is important to wear your sling while the block is still effective in order to protect your arm. It is advisable to take pain medications prior to going to sleep in case the regional anesthesia medication wears off while you are sleeping.    Take your pain medications as directed. Narcotic pain medications can cause lethargy, nausea and constipation. Please contact your surgeon's office and discontinue the medication if these symptoms become problematic. Eating a regular diet, drinking fluids and walking can help with constipation from these medications. Avoid alcohol consumption and driving while taking narcotic pain medications.     Additional pain control options:     You are encouraged to take over the counter medications like Advil / Motrin / Ibuprofen / Aleve in addition to your prescribed pain medications after surgery.    Smoking/Tobacco:  Tobacco use is known to interfere with wound and fracture healing and increase post-operative pain. It can also increase your risk of poor outcomes following surgery. Please do not use tobacco or nicotine products following your surgery. This includes smokeless tobacco, or nicotine replacement products (patches, gum, etc.).    Driving:  It is not advisable to operate a vehicle while using narcotic pain medications. Additionally, please be advised that you are likely to have challenges operating a car or motorcycle while you are still in your postoperative dressing and this could increase your risk of being involved in an accident and being cited for driving while physically impaired.     Warning Signs:  Observe your arm/hand and incision site (if visible) for increased redness, inflammation, drainage, odor or pain that is unrelieved by rest, elevation or medication. Please contact your surgeon's office immediately if you develop  any of these issues or if you develop a fever greater than 101°.    Follow Up Appointments:  Your post-operative appointment has been scheduled for Wednesday, June 4 at 3:30 PM.    [facility], [address] [facility], [place], [place], [address]

## 2025-05-19 ENCOUNTER — HOSPITAL ENCOUNTER (OUTPATIENT)
Facility: CLINIC | Age: 55
Setting detail: OUTPATIENT SURGERY
Discharge: HOME | End: 2025-05-19
Attending: STUDENT IN AN ORGANIZED HEALTH CARE EDUCATION/TRAINING PROGRAM | Admitting: STUDENT IN AN ORGANIZED HEALTH CARE EDUCATION/TRAINING PROGRAM
Payer: COMMERCIAL

## 2025-05-19 VITALS
WEIGHT: 242.51 LBS | OXYGEN SATURATION: 97 % | RESPIRATION RATE: 16 BRPM | HEART RATE: 83 BPM | SYSTOLIC BLOOD PRESSURE: 155 MMHG | BODY MASS INDEX: 45.79 KG/M2 | HEIGHT: 61 IN | DIASTOLIC BLOOD PRESSURE: 85 MMHG | TEMPERATURE: 97.9 F

## 2025-05-19 DIAGNOSIS — G56.02 LEFT CARPAL TUNNEL SYNDROME: Primary | ICD-10-CM

## 2025-05-19 PROCEDURE — 3600000008 HC OR TIME - EACH INCREMENTAL 1 MINUTE - PROCEDURE LEVEL THREE: Performed by: STUDENT IN AN ORGANIZED HEALTH CARE EDUCATION/TRAINING PROGRAM

## 2025-05-19 PROCEDURE — 64721 CARPAL TUNNEL SURGERY: CPT | Performed by: STUDENT IN AN ORGANIZED HEALTH CARE EDUCATION/TRAINING PROGRAM

## 2025-05-19 PROCEDURE — 3600000003 HC OR TIME - INITIAL BASE CHARGE - PROCEDURE LEVEL THREE: Performed by: STUDENT IN AN ORGANIZED HEALTH CARE EDUCATION/TRAINING PROGRAM

## 2025-05-19 PROCEDURE — 2500000005 HC RX 250 GENERAL PHARMACY W/O HCPCS: Mod: JZ | Performed by: STUDENT IN AN ORGANIZED HEALTH CARE EDUCATION/TRAINING PROGRAM

## 2025-05-19 PROCEDURE — 7100000010 HC PHASE TWO TIME - EACH INCREMENTAL 1 MINUTE: Performed by: STUDENT IN AN ORGANIZED HEALTH CARE EDUCATION/TRAINING PROGRAM

## 2025-05-19 PROCEDURE — 7100000009 HC PHASE TWO TIME - INITIAL BASE CHARGE: Performed by: STUDENT IN AN ORGANIZED HEALTH CARE EDUCATION/TRAINING PROGRAM

## 2025-05-19 PROCEDURE — 2500000004 HC RX 250 GENERAL PHARMACY W/ HCPCS (ALT 636 FOR OP/ED): Performed by: STUDENT IN AN ORGANIZED HEALTH CARE EDUCATION/TRAINING PROGRAM

## 2025-05-19 RX ORDER — OXYCODONE HYDROCHLORIDE 5 MG/1
5 TABLET ORAL EVERY 6 HOURS PRN
Qty: 4 TABLET | Refills: 0 | Status: SHIPPED | OUTPATIENT
Start: 2025-05-19

## 2025-05-19 RX ORDER — ACETAMINOPHEN 500 MG
1000 TABLET ORAL EVERY 8 HOURS PRN
Qty: 84 TABLET | Refills: 0 | Status: SHIPPED | OUTPATIENT
Start: 2025-05-19 | End: 2025-06-02

## 2025-05-19 RX ORDER — SODIUM CHLORIDE 0.9 G/100ML
INJECTION, SOLUTION IRRIGATION AS NEEDED
Status: DISCONTINUED | OUTPATIENT
Start: 2025-05-19 | End: 2025-05-19 | Stop reason: HOSPADM

## 2025-05-19 ASSESSMENT — PAIN - FUNCTIONAL ASSESSMENT
PAIN_FUNCTIONAL_ASSESSMENT: 0-10

## 2025-05-19 ASSESSMENT — PAIN SCALES - GENERAL
PAINLEVEL_OUTOF10: 0 - NO PAIN

## 2025-05-19 ASSESSMENT — COLUMBIA-SUICIDE SEVERITY RATING SCALE - C-SSRS
2. HAVE YOU ACTUALLY HAD ANY THOUGHTS OF KILLING YOURSELF?: NO
1. IN THE PAST MONTH, HAVE YOU WISHED YOU WERE DEAD OR WISHED YOU COULD GO TO SLEEP AND NOT WAKE UP?: NO
6. HAVE YOU EVER DONE ANYTHING, STARTED TO DO ANYTHING, OR PREPARED TO DO ANYTHING TO END YOUR LIFE?: NO

## 2025-05-19 NOTE — H&P
History Of Present Illness  Barbie Lopez is a 55 y.o. female presenting with L carpal tunnel syndrome.     Past Medical History  She has a past medical history of Abnormal Pap smear of cervix, Anemia, CTS (carpal tunnel syndrome), Encounter for gynecological examination (general) (routine) without abnormal findings (2022), Encounter for other screening for malignant neoplasm of breast, Encounter for pregnancy test, result negative (2020), Headache, unspecified, Hepatitis B, Immunization not carried out because of patient refusal (10/27/2016), Migraine, Pain in left foot (10/19/2016), Pain in unspecified elbow (2014), Personal history of other diseases of the musculoskeletal system and connective tissue (2013), Unspecified injury of unspecified wrist, hand and finger(s), initial encounter (2015), and Vitamin D deficiency, unspecified (2020).    She has no past medical history of Chlamydia, Congenital heart disease, Depression, Disease of thyroid gland, Endometriosis, Epilepsy, Female infertility, Fibroid, Gestational diabetes, Gonorrhea, Hepatitis C, Herpes, HIV disease (Multi), HPV (human papilloma virus) infection, Hyperthyroidism, Hypothyroidism, Kidney stone, Lupus, Ovarian cancer (Multi), PID (pelvic inflammatory disease), Polycystic ovary syndrome, Pulmonary embolism, Pyelonephritis, Recurrent pregnancy loss, antepartum condition or complication ([facility]), Rh incompatibility, Rheumatoid arthritis, Sickle cell anemia, Syphilis, Tuberculosis, Urinary tract infection, Urogenital trichomoniasis, Urolithiasis, Uterine cancer (Multi), or Varicella.    Surgical History  She has a past surgical history that includes Other surgical history (10/05/2021); Other surgical history (2021);  section, low transverse; Cervical biopsy w/ loop electrode excision; and Colposcopy.     Social History  She reports that she has never smoked. She has never used smokeless tobacco. She  "reports that she does not currently use alcohol. She reports that she does not currently use drugs.    Family History  Family History[1]     Allergies  Latex and Penicillins    Review of Systems   Neurological:         BL hand numbness/tingling in median nerve distribution   All other systems reviewed and are negative.       Physical Exam   Bilateral hands:  Approximately 1.5 x 2 cm simple soft tissue masses about the volar wrist.  No overlying skin changes.  Soft and compressible.  Nonpulsatile.  Negative Tinel's.  otherwise no gross deformity no active skin lesions no open wounds no erythema edema or ecchymoses.  Full composite digital flexion full resisted extension at the MCPs and IP's.  Positive modified Durkan's, positive Tinel at the carpal tunnel, negative Phalen's.  Negative Tinel at the cubital tunnel negative deep elbow flexion test, no signs of ulnar nerve instability at the cubital tunnel.  Motor intact radial ulnar median AIN PIN.  Sensation tact light touch radial ulnar median.  2+ radial warm well-perfused    Last Recorded Vitals  Blood pressure (!) 179/91, pulse 82, temperature 36 °C (96.8 °F), temperature source Temporal, resp. rate 16, height 1.549 m (5' 1\"), weight 110 kg (242 lb 8.1 oz), SpO2 99%.    Relevant Results      Scheduled medications  Scheduled Medications[2]  Continuous medications  Continuous Medications[3]  PRN medications  PRN Medications[4]  No results found for this or any previous visit (from the past 24 hours).    Assessment/Plan   Assessment & Plan  Left carpal tunnel syndrome    54 yo F with EMG confirmed bilateral carpal tunnel syndrome. Plan to proceed with L open carpal tunnel release with Dr. Regan today. Please refer to clinic note for additional information.           Brennen Murray MD         [1]   Family History  Problem Relation Name Age of Onset    Heart disease Mother      Stroke Mother      COPD Mother      Diabetes Mother      Heart failure Mother      Stroke " Father      Diverticulitis Paternal Grandmother     [2] [3] [4]

## 2025-05-19 NOTE — BRIEF OP NOTE
Date: 2025  OR Location: [facility] [place], [place] [facility] OR    Name: Barbie Lopez, : 1970, Age: 55 y.o., MRN: [id number], Sex: female    Diagnosis  Pre-op Diagnosis      * Left carpal tunnel syndrome [G56.02] Post-op Diagnosis     * Left carpal tunnel syndrome [G56.02]     Procedures  Left open carpal tunnel release / 45 minutes  14020 - [place] NEUROPLASTY &/TRANSPOS MEDIAN NRV CARPAL TUNNE      Surgeons      * Kermit Regan - Primary    Resident/Fellow/Other Assistant:  Surgeons and Role:  * No surgeons found with a matching role *    Staff:   Circulator: Nellie Washington Person: Alicia Hernandez Circulator: Elizabeth Hernandez Circulator: Elisa Hernandez Scrub: Gisela    Anesthesia Staff: No anesthesia staff entered.    Procedure Summary  Anesthesia: Anesthesia type not filed in the log.  ASA: ASA status not filed in the log.  Estimated Blood Loss: 5 mL  Intra-op Medications:   Administrations occurring from 1415 to 1515 on 25:   Medication Name Total Dose   sodium chloride 0.9 % irrigation solution 250 mL   BUPivacaine HCl (Marcaine) 0.5 % (5 mg/mL) 5 mL, lidocaine PF (Xylocaine) 10 mg/mL (1 %) 5 mL syringe 10 mL         Intraprocedure I/O Totals       None           Specimen: No specimens collected       Findings: See operative dictation    Complications:  None; patient tolerated the procedure well.     Disposition: PACU - hemodynamically stable.  Condition: stable  Specimens Collected: No specimens collected  Attending Attestation:     Kermit Regan  Phone Number: [phone]

## 2025-05-20 ASSESSMENT — PAIN SCALES - GENERAL: PAINLEVEL_OUTOF10: 3

## 2025-05-20 NOTE — OP NOTE
ORTHOPEDIC OPERATIVE NOTE    Name:     Barbie Lopez  :     1970  Facility:    [facility]  Date of Surgery:   2025     PREOP DX:     Left Carpal tunnel syndrome    POSTOP DX:   Left Carpal tunnel syndrome    PROCEDURE:  Left carpal tunnel release (CPT 11480)    IMPLANTS:   none    SURGEON: Kermit Regan M.D.    RESIDENT/FELLOW/ASSISTANT:  Brennen Murray M.D.    ANESTHESIA:    Local only     ESTIMATED BLOOD LOSS :   5 ml    TOTAL FLUIDS:     none    SPECIMEN:   None    TOURNIQUET TIME:  17 Minutes at 250 mmHg    COMPLICATIONS:  None    PATIENT RETURNED TO/CONDITION:  PACU in Good    INDICATIONS:      Barbie Lopez is a 55 y.o. female who presented with clinically significant left carpal tunnel syndrome which was resistant to nocturnal extension bracing.  This was confirmed with electrodiagnostic studies.  She has soft tissue fullness just proximal to the carpal tunnel for which I ordered an MRI which demonstrated a significantly enlarged median nerve however no definitive lesion or other concerning findings discussed the further steps in treatment for carpal tunnel syndrome.  We discussed operative treatment in the form of an open carpal tunnel release we discussed the risks of this procedure include but are not limited to infection, bleeding, damage tendon structures, chronic pain, chronic stiffness, recurrent carpal tunnel syndrome, incomplete relief of symptoms, wound healing complications, need for subsequent surgeries.  We also discussed the postop course in great detail.  The patient clearly understood the clinical scenario.  All questions were answered.  There are no barriers to understanding.  Patient like to proceed with a left open carpal tunnel release.  I personally obtained surgical informed consent.  I personally marked the surgical site    DESCRIPTION OF PROCEDURE:  A Pre-Procedure safety timeout was performed with all members of the nursing, and operative team. The  patient was correctly identified using multiple unique patient identifiers, the correct laterality, the correct surgical site, and the correct listed procedure on the consent were confirmed.     The patient remained supine on the stretcher with all bony prominences well-padded.  A very well-padded brachial pneumatic tourniquet was applied to the operative extremity.    A local field block was performed using a total of 10 cc consisting of equal parts 1% lidocaine without epinephrine and 0.5% Marcaine without epinephrine.  This was done in the usual sterile fashion.  Patient tolerated the procedure well.    The operative extremity was prepped with ChloraPrep stick solution and draped in the usual sterile fashion.    A Safety Huddle was performed with all members of the nursing, and operative team. The patient was correctly identified using multiple unique patient identifiers, the correct laterality, the correct surgical site, and the correct listed procedure on the consent were confirmed.     We next planned our surgical incision.  This was a standard open carpal tunnel release incision approximately 3 cm in length and aligned with the radial aspect of the ring finger with the distal extent to García's cardinal line and the proximal extent to the wrist flexion crease.  I also brook out an extended carpal tunnel release incision due to her unusual median nerve anatomy.  This was a ulnarly based Josie style oblique limb at the wrist flexion crease and then back radially in a continuation longitudinally of approximately 3 cm.    The extremity was exsanguinated with the forearm Esmarch bandage.  The tourniquet was inflated to 250 mmHg.    Skin incision was made with a 15 blade.  Deeper dissection was carried out with small dissecting scissors with a spreading fashion.  Meticulous hemostasis was obtained with bipolar cautery of the subcutaneous veins.  The palmar aponeurosis of readily identified and sharply incised with  a scissor in line with the skin incision.  This revealed the transverse fibers of the transverse carpal ligament.  This was gently exposed by sweeping the ulnar aspect of the thenar musculature and ulnar to radial direction with a 15 blade.  The transverse carpal ligament was then carefully sharply incised for length about 3 mm taking great care not to injure the underlying median nerve.  The remainder of the distal release was performed with small dissecting scissors with very safe small cuts with the scissors taking great care not to injure the nerve.  The sentinel fat pad was readily identified in the distal release was completed.  We then focused on the proximal release.  Using a Metzenbaum scissor passed in retrograde fashion superficial to the distal aspect of the antebrachial fascia and then deep to the antebrachial fascia with superficial to the median nerve to free up any potential adhesions.  The patient was having some discomfort in the median nerve is a very large in diameter send made the decision to extend the skin incision for the complete extended carpal tunnel release incision.  Skin incision was made with a 15 blade.  Deeper dissection was carried out with small dissecting scissors.  Meticulous hemostasis of the subcutaneous veins was accomplished with bipolar cautery.  We could readily visualize the distal aspect of the antebrachial fascia.  This was safely sharply incised with a Metzenbaum scissor.  This completed our full release of the median nerve at the level of the wrist.  Median nerve was inspected and determined to be intact and uninjured throughout.  There were no abnormal lesions.  It was just slightly large in diameter.  The tourniquet was released after total time of 17 minutes.  After release of the tourniquet the forearm and hand compartments are soft and compressible.  All fingers are very clearly warm and well-perfused with brisk capillary refill.  There are no signs of arterial  bleeding.  The skin was closed with #3-0 nylon suture in an interrupted horizontal mattress fashion.  The wound was dressed with Xeroform sterile gauze sterile Webril and a loosely applied Ace wrap.    All surgical counts were correct including case.  The patient returned to recovery without complication.      POST-OPERATIVE PLAN:  The patient will be okay for light use of the left hand.  Recommend keeping the postoperative dressing on clean and intact until a 2-week follow-up visit.  I encourage aggressive digital motion.  Recommend against lifting anything heavier than a cup of coffee.  Patient was written for extra-strength Tylenol and a very short supply of oxycodone to be taken as needed for pain.  My office is arrange a 2-week follow-up visit at which point we will discontinue the surgical dressing examine the wound and likely remove the nylon sutures.        Electronically signed  Kermit Regan MD  [phone]

## 2025-05-28 ENCOUNTER — APPOINTMENT (OUTPATIENT)
Dept: PODIATRY | Facility: CLINIC | Age: 55
End: 2025-05-28
Payer: COMMERCIAL

## 2025-06-04 ENCOUNTER — OFFICE VISIT (OUTPATIENT)
Dept: ORTHOPEDIC SURGERY | Facility: HOSPITAL | Age: 55
End: 2025-06-04
Payer: COMMERCIAL

## 2025-06-04 ENCOUNTER — PATIENT MESSAGE (OUTPATIENT)
Dept: ORTHOPEDIC SURGERY | Facility: HOSPITAL | Age: 55
End: 2025-06-04

## 2025-06-04 DIAGNOSIS — G56.02 CARPAL TUNNEL SYNDROME OF LEFT WRIST: Primary | ICD-10-CM

## 2025-06-04 PROCEDURE — 99024 POSTOP FOLLOW-UP VISIT: CPT | Performed by: STUDENT IN AN ORGANIZED HEALTH CARE EDUCATION/TRAINING PROGRAM

## 2025-06-04 NOTE — PATIENT COMMUNICATION
Nurse left patient a message about rescheduling 1st post op appointment a month from surgery date. Nurse stated the importance of needing to see the patient within in the first 2 weeks of surgery for splint removal/suture removal/ and surgical site evaluation. Nurse left patient office telephone number via voicemail to reschedule for soon visit. [facility] 6/4/25

## 2025-06-04 NOTE — PROGRESS NOTES
Pt cancelled post-op visit for the second time. Our office reached out regarding the importance of coming to post-op visits.

## 2025-06-10 ENCOUNTER — TELEPHONE (OUTPATIENT)
Dept: OBSTETRICS AND GYNECOLOGY | Facility: HOSPITAL | Age: 55
End: 2025-06-10
Payer: COMMERCIAL

## 2025-06-10 DIAGNOSIS — Z12.31 ENCOUNTER FOR SCREENING MAMMOGRAM FOR MALIGNANT NEOPLASM OF BREAST: ICD-10-CM

## 2025-06-12 ENCOUNTER — HOSPITAL ENCOUNTER (OUTPATIENT)
Dept: RADIOLOGY | Facility: HOSPITAL | Age: 55
Discharge: HOME | End: 2025-06-12
Payer: COMMERCIAL

## 2025-06-12 DIAGNOSIS — Z12.31 ENCOUNTER FOR SCREENING MAMMOGRAM FOR MALIGNANT NEOPLASM OF BREAST: ICD-10-CM

## 2025-06-12 PROCEDURE — 77067 SCR MAMMO BI INCL CAD: CPT

## 2025-06-12 PROCEDURE — 77067 SCR MAMMO BI INCL CAD: CPT | Performed by: RADIOLOGY

## 2025-06-12 PROCEDURE — 77063 BREAST TOMOSYNTHESIS BI: CPT | Performed by: RADIOLOGY

## 2025-06-18 ENCOUNTER — APPOINTMENT (OUTPATIENT)
Dept: ORTHOPEDIC SURGERY | Facility: HOSPITAL | Age: 55
End: 2025-06-18
Payer: COMMERCIAL

## 2025-06-18 VITALS — HEIGHT: 61 IN | WEIGHT: 242 LBS | BODY MASS INDEX: 45.69 KG/M2

## 2025-06-18 DIAGNOSIS — G56.02 CARPAL TUNNEL SYNDROME OF LEFT WRIST: Primary | ICD-10-CM

## 2025-06-18 PROCEDURE — 99202 OFFICE O/P NEW SF 15 MIN: CPT | Performed by: STUDENT IN AN ORGANIZED HEALTH CARE EDUCATION/TRAINING PROGRAM

## 2025-06-18 PROCEDURE — 3008F BODY MASS INDEX DOCD: CPT | Performed by: STUDENT IN AN ORGANIZED HEALTH CARE EDUCATION/TRAINING PROGRAM

## 2025-06-18 PROCEDURE — 99024 POSTOP FOLLOW-UP VISIT: CPT | Performed by: STUDENT IN AN ORGANIZED HEALTH CARE EDUCATION/TRAINING PROGRAM

## 2025-06-18 NOTE — PROGRESS NOTES
CHIEF COMPLAINT: 1st POV  DOI:no injury   DOS: L CTR on 5/19/2025      HISTORY OF PRESENT ILLNESS  Patient missed her first scheduled postop visit due to difficulties with getting transportation.  She discontinued her postop dressing at home.  She removed her sutures on her own at home after 1 week.  She is very happy with her current outcome.  She denies any persistent carpal tunnel symptoms at this time.    PHYSICAL EXAM    Extremities / Musculoskeletal:  Left upper extremity:  Well-healed extensile carpal tunnel release incision.  No signs of infection whatsoever.  No drainage no active bleeding.  Hand compartment soft compressible.  Mild tenderness around the incision.  Full composite digital flexion full resisted extension.  FDS FDP intact index or small finger FPL EPL intact.  Motor intact to radian/PIN/median/AIN/Ulnar nerve distributions. Sensation intact to light touch in the median/ulnar/radial nerve distributions. 2+ radial pulse at the wrist, hand and all digits are warm and well-perfused with a brisk capillary refill of <2s.                    HgA1c:   Lab Results   Component Value Date    HGBA1C 5.4 05/22/2024    [id number] 108 05/22/2024       IMAGING / LABS / EMGs:    No imaging obtained for today's visit.     Medical History[1]    Medication Documentation Review Audit       Reviewed by Trina Wang LPN (Licensed Nurse) on 06/18/25 at 1314      Medication Order Taking? Sig Documenting Provider Last Dose Status   APPLE CIDER VINEGAR ORAL [id number] No Take 1 tablet by mouth once daily. Historical Provider, MD Past Week Active   aspirin 81 mg EC tablet [id number] No Take 1 tablet (81 mg) by mouth once daily. Historical Provider, MD Past Week Active   B complex-vitamin C-folic acid (Nephro-Candelaria Rx) 1- mg-mg-mcg tablet [id number] No Take 1 tablet by mouth once daily with breakfast. Historical Provider, MD 5/18/2025 Active   multivitamin with minerals tablet [id number] No Take 1 tablet by mouth once  daily. Historical Provider, MD 5/18/2025 Active   oxyCODONE (Roxicodone) 5 mg immediate release tablet [id number]  Take 1 tablet (5 mg) by mouth every 6 hours if needed for severe pain (7 - 10) for up to 4 doses. Brennen Murray MD  Active                    RX Allergies[2]    Surgical History[3]      ASSESSMENT:   Status post left open carpal tunnel release    We discussed the risks of noncompliance with discontinuing her postop dressing and sutures at home however this patient is doing very well clinically.  We discussed that at this time there are no further restrictions since the wound is fully healed.  We discussed pillar pain although this is not significant at today's visit.    PLAN:   Activities as tolerated, no restrictions     Follow-up in: Although we do not need a scheduled follow-up visit at this time, I encouraged the patient to return to clinic if they have any concerns or issues whatsoever. The patient was provided with my office's contact information.   XR at next visit: none    The diagnosis and treatment plan were reviewed with the patient. All questions were answered. The patient verbalized understanding of the treatment plan. There were no barriers to understanding identified.     Note dictated with iHeart software.  Completed without full type editing and sent to avoid delay.    Kermit Regan M.D.    Department of Orthopaedics  Hand/Upper Extremity  Phone: [phone]  Appt. Phone: [phone]\         [1]   Past Medical History:  Diagnosis Date    Abnormal Pap smear of cervix     Anemia     Arthritis     Left shoulder    CTS (carpal tunnel syndrome)     Encounter for gynecological examination (general) (routine) without abnormal findings 06/27/2022    Well woman exam with routine gynecological exam    Encounter for other screening for malignant neoplasm of breast     Breast cancer screening    Encounter for pregnancy test, result negative 12/21/2020     Urine pregnancy test negative    Headache, unspecified     Frequent headaches    Hepatitis B     Immunization not carried out because of patient refusal 10/27/2016    Influenza vaccination declined    Migraine     Pain in left foot 10/19/2016    Left foot pain    Pain in unspecified elbow 2014    Elbow pain    Personal history of other diseases of the musculoskeletal system and connective tissue 2013    History of lateral epicondylitis of left elbow    Unspecified injury of unspecified wrist, hand and finger(s), initial encounter 2015    Wrist injury    Vitamin D deficiency, unspecified 2020    Vitamin D deficiency   [2]   Allergies  Allergen Reactions    Latex Rash    Penicillins Rash and Unknown   [3]   Past Surgical History:  Procedure Laterality Date    CERVICAL BIOPSY  W/ LOOP ELECTRODE EXCISION       SECTION, LOW TRANSVERSE      COLONOSCOPY      COLPOSCOPY      OTHER SURGICAL HISTORY  10/05/2021    Hernia repair    OTHER SURGICAL HISTORY  2021     section

## 2025-06-25 ENCOUNTER — APPOINTMENT (OUTPATIENT)
Dept: OBSTETRICS AND GYNECOLOGY | Facility: HOSPITAL | Age: 55
End: 2025-06-25
Payer: COMMERCIAL

## 2025-07-14 PROBLEM — I10 ELEVATED BLOOD PRESSURE READING WITH DIAGNOSIS OF HYPERTENSION: Status: RESOLVED | Noted: 2025-02-06 | Resolved: 2025-07-14

## 2025-07-14 PROBLEM — R03.0 ELEVATED BLOOD PRESSURE READING WITHOUT DIAGNOSIS OF HYPERTENSION: Status: RESOLVED | Noted: 2023-01-11 | Resolved: 2025-07-14

## 2025-07-15 ENCOUNTER — APPOINTMENT (OUTPATIENT)
Dept: OBSTETRICS AND GYNECOLOGY | Facility: HOSPITAL | Age: 55
End: 2025-07-15
Payer: COMMERCIAL

## 2025-07-15 VITALS
WEIGHT: 239 LBS | SYSTOLIC BLOOD PRESSURE: 144 MMHG | HEIGHT: 62 IN | BODY MASS INDEX: 43.98 KG/M2 | DIASTOLIC BLOOD PRESSURE: 88 MMHG | HEART RATE: 73 BPM

## 2025-07-15 DIAGNOSIS — Z01.419 WELL WOMAN EXAM: Primary | ICD-10-CM

## 2025-07-15 DIAGNOSIS — N95.1 PERIMENOPAUSAL SYMPTOMS: ICD-10-CM

## 2025-07-15 PROCEDURE — 3077F SYST BP >= 140 MM HG: CPT

## 2025-07-15 PROCEDURE — 1036F TOBACCO NON-USER: CPT

## 2025-07-15 PROCEDURE — 3008F BODY MASS INDEX DOCD: CPT

## 2025-07-15 PROCEDURE — 99396 PREV VISIT EST AGE 40-64: CPT

## 2025-07-15 PROCEDURE — 3079F DIAST BP 80-89 MM HG: CPT

## 2025-07-15 SDOH — ECONOMIC STABILITY: FOOD INSECURITY: WITHIN THE PAST 12 MONTHS, YOU WORRIED THAT YOUR FOOD WOULD RUN OUT BEFORE YOU GOT MONEY TO BUY MORE.: NEVER TRUE

## 2025-07-15 SDOH — ECONOMIC STABILITY: FOOD INSECURITY: WITHIN THE PAST 12 MONTHS, THE FOOD YOU BOUGHT JUST DIDN'T LAST AND YOU DIDN'T HAVE MONEY TO GET MORE.: NEVER TRUE

## 2025-07-15 ASSESSMENT — LIFESTYLE VARIABLES
HOW OFTEN DO YOU HAVE A DRINK CONTAINING ALCOHOL: NEVER
HOW OFTEN DO YOU HAVE SIX OR MORE DRINKS ON ONE OCCASION: NEVER
HOW MANY STANDARD DRINKS CONTAINING ALCOHOL DO YOU HAVE ON A TYPICAL DAY: PATIENT DOES NOT DRINK
AUDIT-C TOTAL SCORE: 0
SKIP TO QUESTIONS 9-10: 1

## 2025-07-15 ASSESSMENT — PATIENT HEALTH QUESTIONNAIRE - PHQ9
1. LITTLE INTEREST OR PLEASURE IN DOING THINGS: NOT AT ALL
2. FEELING DOWN, DEPRESSED OR HOPELESS: NOT AT ALL
SUM OF ALL RESPONSES TO PHQ9 QUESTIONS 1 & 2: 0

## 2025-07-15 ASSESSMENT — SOCIAL DETERMINANTS OF HEALTH (SDOH)
WITHIN THE LAST YEAR, HAVE YOU BEEN KICKED, HIT, SLAPPED, OR OTHERWISE PHYSICALLY HURT BY YOUR PARTNER OR EX-PARTNER?: NO
WITHIN THE LAST YEAR, HAVE YOU BEEN AFRAID OF YOUR PARTNER OR EX-PARTNER?: NO
WITHIN THE LAST YEAR, HAVE TO BEEN RAPED OR FORCED TO HAVE ANY KIND OF SEXUAL ACTIVITY BY YOUR PARTNER OR EX-PARTNER?: NO
WITHIN THE LAST YEAR, HAVE YOU BEEN HUMILIATED OR EMOTIONALLY ABUSED IN OTHER WAYS BY YOUR PARTNER OR EX-PARTNER?: NO

## 2025-07-15 ASSESSMENT — PAIN SCALES - GENERAL: PAINLEVEL_OUTOF10: 0-NO PAIN

## 2025-07-15 NOTE — PROGRESS NOTES
"Assessment/Plan   Problem List Items Addressed This Visit    None  Visit Diagnoses         Codes      Well woman exam    -  Primary Z01.419    Relevant Orders    Hemoglobin A1C    Cholesterol      Perimenopausal symptoms     N95.1    Relevant Orders    FSH & LH    Progesterone    Estrogens, Total            NETTA Urban     José Lopez is a 55 y.o. female who is here for a routine exam. Periods are irregular, lasting 1-2 days. Dysmenorrhea:none. Cyclic symptoms include none. No intermenstrual bleeding, spotting, or discharge.  She states ~6 months has gone by without any vaginal bleeding.  She is wishing to get repeat hormone labs to determine \"where she is in menopause\".  She would also like repeat hemoglobin A1C and cholesterol levels.  No other concerns.    ROS      /88 (BP Location: Left arm, Patient Position: Sitting, BP Cuff Size: Large adult)   Pulse 73   Ht 1.562 m (5' 1.5\")   Wt 108 kg (239 lb)   BMI 44.43 kg/m²     General:   Alert and oriented x 3   Heart:  Thyroid: Regular rate, rhythm  Euthyroid, normal shape and size   Lungs:  Breast: Clear to auscultation bilaterally  Deferred breast exam today; mammogram completed in 6/2025 WNL   Abdomen: Soft, non tender                       Deferred pelvic exam today due to no patient concerns.  PAP due in 2027 due to hx of abnormal PAP in 2022 with WNL, HPV+ and WNL PAP and HPV in 2024.    Thank you for coming to see me for your visit today and most importantly thank you for having a preventative visit.  If lab work was sent, you will see your test result via [employer]  Any prescriptions will be sent electronically to your pharmacy listed    I look forward to seeing you next year for your health care needs.  Please remember:   Sleep is important - make it a priority for at least 6 hours per night!   Exercise such as walking for 20 minutes per day is beneficial to your physical and mental health   Healthy diets can be " expensive -- if you every feel like you are struggling to afford healthy food, please let me know as we have a very good program called [employer] that is available to you   Decrease alcohol and tobacco.  A goal of less than 7 alcoholic drinks per week is recommended for risk reduction of breast and colon cancer by 38%!    Be well!  Rossana

## 2025-07-16 ENCOUNTER — RESULTS FOLLOW-UP (OUTPATIENT)
Dept: OBSTETRICS AND GYNECOLOGY | Facility: HOSPITAL | Age: 55
End: 2025-07-16
Payer: COMMERCIAL

## 2025-07-16 DIAGNOSIS — E78.00 HIGH CHOLESTEROL: Primary | ICD-10-CM

## 2025-07-16 NOTE — TELEPHONE ENCOUNTER
----- Message from Rossana Rene sent at 7/16/2025  9:28 AM EDT -----  Elevated total cholesterol  I placed orders for her to go get more in depth evaluation of what is elevated  Does she have a PCP?  If not, a referral should be placed so they can follow her with her cholesterol levels  ----- Message -----  From: Norm Signadyne Results In  Sent: 7/16/2025   7:07 AM EDT  To: NETTA Urban

## 2025-07-16 NOTE — TELEPHONE ENCOUNTER
Nurse called pt to review results of Cholesterol test. Pt identified by name and . Pt informed of her elevated Cholesterol level and the need for additional testing. Nurse made pt aware that additional labs to check her HDL, LDL, and Triglycerides was placed by DANE Pratt CNM. Pt was also informed that she will need to follow up with a primary care provider regarding the remainder of her test results. Pt verbalized understanding. No questions or concerns voiced.

## 2025-07-20 LAB
CHOLEST SERPL-MCNC: 269 MG/DL
EST. AVERAGE GLUCOSE BLD GHB EST-MCNC: 114 MG/DL
EST. AVERAGE GLUCOSE BLD GHB EST-SCNC: 6.3 MMOL/L
ESTROGEN SERPL-MCNC: 160 PG/ML
FSH SERPL-ACNC: 63.6 MIU/ML
HBA1C MFR BLD: 5.6 %
LH SERPL-ACNC: 48 MIU/ML
PROGEST SERPL-MCNC: <0.5 NG/ML

## 2025-08-05 ENCOUNTER — APPOINTMENT (OUTPATIENT)
Dept: CARDIOLOGY | Facility: HOSPITAL | Age: 55
End: 2025-08-05
Payer: COMMERCIAL

## 2025-08-05 DIAGNOSIS — I15.9 SECONDARY HYPERTENSION: Primary | ICD-10-CM

## (undated) DEVICE — APPLICATOR, CHLORAPREP, W/ORANGE TINT, 26ML

## (undated) DEVICE — SYRINGE, 20 CC, LUER LOCK

## (undated) DEVICE — BANDAGE, ELASTIC, MATRIX, SELF-CLOSURE, 2 IN X 5 YD, LF

## (undated) DEVICE — CUFF, TOURNIQUET, SINGLE BLADDER, DUAL PORT, 18 IN, RED, DISPOSABLE

## (undated) DEVICE — GLOVE, SURGICAL, PROTEXIS PI , 7.5, PF, LF

## (undated) DEVICE — SUTURE, ETHILON, 3-0, 18 IN, PS1, BLACK

## (undated) DEVICE — Device

## (undated) DEVICE — GLOVE, SURGICAL, PROTEXIS PI BLUE W/NEUTHERA, 7.5, PF, LF

## (undated) DEVICE — DRESSING, GAUZE, SPONGE, 12 PLY, 4 X 4 IN, PLASTIC POUCH, STRL 10PK

## (undated) DEVICE — STOCKINETTE, DOUBLE PLY, 4 X 48 IN, STERILE

## (undated) DEVICE — BANDAGE, ORTHOPEDIC, WEBRIL, 2 IN, LF, STERILE